# Patient Record
Sex: FEMALE | Race: OTHER | HISPANIC OR LATINO | Employment: OTHER | URBAN - METROPOLITAN AREA
[De-identification: names, ages, dates, MRNs, and addresses within clinical notes are randomized per-mention and may not be internally consistent; named-entity substitution may affect disease eponyms.]

---

## 2017-05-01 NOTE — PATIENT DISCUSSION
Pinguecula Counseling:  I have explained to the patient at length the diagnosis of pinguecula and its pathophysiology. I recommended the patient adequately protect their eyes from excessive UV light and dry, zafar conditions. The use of artificial tears in dry conditions was encouraged. Return for follow-up as scheduled.

## 2017-05-01 NOTE — PATIENT DISCUSSION
MODESTA OU:  PRESCRIBE ARTIFICIAL TEARS BID - QID. DECREASE OUTDOOR EXPOSURE AND USE UV PROTECTION/ SUNGLASSES WITH OUTDOOR ACTIVITIES. RETURN FOR FOLLOW UP AS SCHEDULED.

## 2017-05-01 NOTE — PATIENT DISCUSSION
Hydroxychloroquine (Plaquenil) Counseling:  I have discussed the ophthalmic risks of chronic hydroxychloroquine therapy, including but not limited to abnormal color vision, decreased central vision, and difficulty adjusting to darkness. Risk factors for toxicity include dose, use &gt;5 years, age &gt;65, renal and/or hepatic failure. The patient is encouraged to call back with any visual disturbance, and to keep follow-up appointments as scheduled.

## 2017-05-01 NOTE — PATIENT DISCUSSION
EXAM AFTER USE OF A HIGH RISK MEDICATION: PATIENT HAS USED PLAQUENIL. NO EVIDENCE OF MACULOPATHY OR RETINAL TOXICITY ON TODAY'S EXAM OF BOTH EYES.

## 2019-03-26 NOTE — PATIENT DISCUSSION
Contact lens overwear with secondary corneal edema, OS: CL REMOVED IN OFFICE WITH EASE. RECOMMEND NO CONTACT LENS WEAR UNTIL FURTHER ADVISED.

## 2019-03-26 NOTE — PATIENT DISCUSSION
Acute Iritis Counseling: I have explained the diagnosis and pathophysiology acute iritis. In isolated circumstances this is usually idiopathic, however recurrent intraocular inflammation may be associated with systemic disease and medical and laboratory workup may be necessary in conjunction with the patient's primary care doctor. I have stressed compliance with drops and follow-up.

## 2019-03-26 NOTE — PATIENT DISCUSSION
New Prescription: Durezol (difluprednate): drops: 0.05% 1 drop four times a day as directed into left eye 03-

## 2019-03-26 NOTE — PATIENT DISCUSSION
ACUTE IRITIS,  OS: PRESCRIBE DUREZOL QID OS UNTIL FURTHER ADVISED AT FOLLOW UP. DISCUSSED IMPORTANCE OF FOLLOW UP. PRIMARY RECURRENCE WITH HISTORY OF RHEUMATOID ARTHRITIS. FOLLOW-UP AS SCHEDULED.

## 2019-04-02 NOTE — PATIENT DISCUSSION
ACUTE IRITIS, OS: RESOLVED. BEGIN TAPER OF DUREZOL OS AS FOLLOWS: TID X 5 DAYS, BID X 3 DAYS, QD X 3 DAYS, THEN STOP. FOLLOW-UP AS SCHEDULED OR SOONER IF SYMPTOMS RETURN.
Acute Iritis Counseling: I have explained the diagnosis and pathophysiology acute iritis. In isolated circumstances this is usually idiopathic, however recurrent intraocular inflammation may be associated with systemic disease and medical and laboratory workup may be necessary in conjunction with the patient's primary care doctor. I have stressed compliance with drops and follow-up.
COUNSELING:
Continue: Durezol (difluprednate): drops: 0.05% 1 drop four times a day as directed into left eye
Continue: Systane (PF) (peg 400-propylene glycol (pf)): dropperette: 0.4-0.3%
General:
Medications:
PAST SURGICAL HISTORY:  H/O hand surgery 1980's right    H/O mastectomy, bilateral     S/P D&C (status post dilation and curettage) x 2 many years ago

## 2019-06-03 NOTE — PATIENT DISCUSSION
CATARACT, OU - VISUALLY SIGNIFICANT. SCHEDULE PHACO WITH IOL OD  FIRST THEN OS IF VISUAL SYMPTOMS PERSIST. GLASSES RX GIVEN TO FILL IF DESIRES IN THE EVENT PATIENT DOES NOT PROCEED WITH SURGERY.

## 2019-06-03 NOTE — PATIENT DISCUSSION
Surgery Counseling:  I have discussed the option of glasses versus cataract surgery versus following, It was explained that when vision no longer meets the patient's visual needs and a new prescription for glasses is not likely to improve the patient's visual symptoms, the option of cataract surgery is a reasonable next step. It was explained that there is no guarantee that removing the cataract will improve their visual symptoms; however, it is believed that the cataract is contributing to the patient's visual impairment and surgery may noticeably improve both the visual and functional status of the patient. After this discussion, the patient desires to proceed with cataract surgery with implantation of an intraocular lens to improve their vision for reading and to reduce glare for driving at night.

## 2019-06-03 NOTE — PATIENT DISCUSSION
DERMATOCHALASIS / PTOSIS RUL AND TALYA :  VISUALLY SIGNIFICANT TO PATIENT. SCHEDULE WITH OCULOPLASTIC SPECIALIST IF PATIENT DESIRES.

## 2019-06-11 NOTE — PATIENT DISCUSSION
Surgery Drop Counseling:  I have prescribed Besivance, Ilevro and Pred Forte for use as directed before and after cataract surgery.

## 2019-06-11 NOTE — PATIENT DISCUSSION
New Prescription: Ilevro (nepafenac): drops,suspension: 0.3% 1 drop every morning as directed into right eye 06-

## 2019-07-24 NOTE — PATIENT DISCUSSION
CATARACT, OS: VISUALLY SIGNIFICANT. SCHEDULE PHACO WITH TORIC IOL AND FEMTOSECOND LASER. IOL MASTER REVIEWED AND INTERPRETED ON H&amp;P SHEET FOR OS TODAY. CONSENTS FOR SECOND EYE DISCUSSED WITH PATIENT TODAY. PATIENT UNDERSTANDS AND HAS NO FURTHER QUESTIONS. BECAUSE THE PATIENT EXPERIENCED PAIN DURING THE RIGHT EYE SURGERY SHE WILL NEED ADDITIONAL ANESTHESIA FOR THE LEFT EYE SURGERY.

## 2019-07-24 NOTE — PATIENT DISCUSSION
Continue: Ilevro (nepafenac): drops,suspension: 0.3% 1 drop every morning as directed into right eye 06-

## 2019-07-24 NOTE — PATIENT DISCUSSION
Taper as directed in right eye. Start immediately after surgery in left eye and continue as directed.

## 2019-07-24 NOTE — PATIENT DISCUSSION
Continue as directed in right eye. Start two days prior to surgery in left eye and continue as directed.

## 2019-07-24 NOTE — PATIENT DISCUSSION
*Pre-Op 2nd Eye Counseling: The patient has noticed an improvement in their visual symptoms in the operative eye. The patient complains of decreased vision in the fellow eye when in the bright sunlight due to glare sensitivity. It was explained to the patient that the decision to proceed with cataract surgery in the fellow eye is entirely a separate decision from the surgical eye. All of the same risks, benefits and alternatives ere reviewed with the patient again. The patient does feel the vision in the non-operative eye is limiting their daily activities and elects to proceed with surgery in the cataract eye.

## 2019-08-29 NOTE — PATIENT DISCUSSION
Post-Op Instructions OS: Pred Forte (Prednisolone) and Ilevro (Nepafenac) 1 time per day for 1 week, then discontinue.

## 2020-01-29 NOTE — PATIENT DISCUSSION
S/P PC IOL, OD: GOOD POST OP RESULT. STOP ANTIBIOTIC DROP IN ONE WEEK. CONTINUE OTHER DROPS AS DIRECTED UNTIL FURTHER INSTRUCTION. OK TO PROCEED WITH CATARACT SURGERY IN OS. no

## 2022-04-26 ENCOUNTER — EMERGENCY VISIT (OUTPATIENT)
Dept: URBAN - METROPOLITAN AREA CLINIC 39 | Facility: CLINIC | Age: 57
End: 2022-04-26

## 2022-04-26 DIAGNOSIS — H11.31: ICD-10-CM

## 2022-04-26 PROCEDURE — 92002 INTRM OPH EXAM NEW PATIENT: CPT

## 2022-04-26 ASSESSMENT — TONOMETRY
OS_IOP_MMHG: 16
OD_IOP_MMHG: 16

## 2022-04-26 ASSESSMENT — VISUAL ACUITY
OD_CC: 20/40-2
OS_CC: 20/40-2
